# Patient Record
Sex: MALE | Race: WHITE | NOT HISPANIC OR LATINO | Employment: FULL TIME | URBAN - METROPOLITAN AREA
[De-identification: names, ages, dates, MRNs, and addresses within clinical notes are randomized per-mention and may not be internally consistent; named-entity substitution may affect disease eponyms.]

---

## 2023-04-22 DIAGNOSIS — I28.8 DILATION OF PULMONARY ARTERY (MULTI): ICD-10-CM

## 2023-04-22 DIAGNOSIS — R93.1 AGATSTON CAC SCORE, >400: Primary | ICD-10-CM

## 2023-04-22 DIAGNOSIS — I77.810 ASCENDING AORTA DILATATION (CMS-HCC): ICD-10-CM

## 2023-05-04 DIAGNOSIS — E03.9 HYPOTHYROIDISM, UNSPECIFIED: ICD-10-CM

## 2023-05-04 RX ORDER — LEVOTHYROXINE SODIUM 175 UG/1
TABLET ORAL
Qty: 90 TABLET | Refills: 1 | Status: SHIPPED | OUTPATIENT
Start: 2023-05-04 | End: 2023-12-04

## 2023-06-06 DIAGNOSIS — I10 ESSENTIAL (PRIMARY) HYPERTENSION: ICD-10-CM

## 2023-06-06 RX ORDER — LISINOPRIL 10 MG/1
TABLET ORAL
Qty: 90 TABLET | Refills: 1 | Status: SHIPPED | OUTPATIENT
Start: 2023-06-06 | End: 2023-12-04

## 2023-06-14 LAB
ALANINE AMINOTRANSFERASE (SGPT) (U/L) IN SER/PLAS: 33 U/L (ref 10–52)
ALBUMIN (G/DL) IN SER/PLAS: 4.4 G/DL (ref 3.4–5)
ALKALINE PHOSPHATASE (U/L) IN SER/PLAS: 51 U/L (ref 33–136)
ANION GAP IN SER/PLAS: 12 MMOL/L (ref 10–20)
APPEARANCE, URINE: CLEAR
ASPARTATE AMINOTRANSFERASE (SGOT) (U/L) IN SER/PLAS: 22 U/L (ref 9–39)
BASOPHILS (10*3/UL) IN BLOOD BY AUTOMATED COUNT: 0.07 X10E9/L (ref 0–0.1)
BASOPHILS/100 LEUKOCYTES IN BLOOD BY AUTOMATED COUNT: 1.1 % (ref 0–2)
BILIRUBIN TOTAL (MG/DL) IN SER/PLAS: 0.8 MG/DL (ref 0–1.2)
BILIRUBIN, URINE: NEGATIVE
BLOOD, URINE: NEGATIVE
CALCIUM (MG/DL) IN SER/PLAS: 9.2 MG/DL (ref 8.6–10.6)
CARBON DIOXIDE, TOTAL (MMOL/L) IN SER/PLAS: 27 MMOL/L (ref 21–32)
CHLORIDE (MMOL/L) IN SER/PLAS: 104 MMOL/L (ref 98–107)
CHOLESTEROL (MG/DL) IN SER/PLAS: 191 MG/DL (ref 0–199)
CHOLESTEROL IN HDL (MG/DL) IN SER/PLAS: 46.6 MG/DL
CHOLESTEROL/HDL RATIO: 4.1
COLOR, URINE: YELLOW
CREATININE (MG/DL) IN SER/PLAS: 0.64 MG/DL (ref 0.5–1.3)
EOSINOPHILS (10*3/UL) IN BLOOD BY AUTOMATED COUNT: 0.47 X10E9/L (ref 0–0.7)
EOSINOPHILS/100 LEUKOCYTES IN BLOOD BY AUTOMATED COUNT: 7.4 % (ref 0–6)
ERYTHROCYTE DISTRIBUTION WIDTH (RATIO) BY AUTOMATED COUNT: 12.4 % (ref 11.5–14.5)
ERYTHROCYTE MEAN CORPUSCULAR HEMOGLOBIN CONCENTRATION (G/DL) BY AUTOMATED: 32.6 G/DL (ref 32–36)
ERYTHROCYTE MEAN CORPUSCULAR VOLUME (FL) BY AUTOMATED COUNT: 95 FL (ref 80–100)
ERYTHROCYTES (10*6/UL) IN BLOOD BY AUTOMATED COUNT: 5.18 X10E12/L (ref 4.5–5.9)
GFR MALE: >90 ML/MIN/1.73M2
GLUCOSE (MG/DL) IN SER/PLAS: 99 MG/DL (ref 74–99)
GLUCOSE, URINE: NEGATIVE MG/DL
HEMATOCRIT (%) IN BLOOD BY AUTOMATED COUNT: 49.1 % (ref 41–52)
HEMOGLOBIN (G/DL) IN BLOOD: 16 G/DL (ref 13.5–17.5)
IMMATURE GRANULOCYTES/100 LEUKOCYTES IN BLOOD BY AUTOMATED COUNT: 0.8 % (ref 0–0.9)
KETONES, URINE: NEGATIVE MG/DL
LDL: 112 MG/DL (ref 0–99)
LEUKOCYTE ESTERASE, URINE: NEGATIVE
LEUKOCYTES (10*3/UL) IN BLOOD BY AUTOMATED COUNT: 6.4 X10E9/L (ref 4.4–11.3)
LYMPHOCYTES (10*3/UL) IN BLOOD BY AUTOMATED COUNT: 2.23 X10E9/L (ref 1.2–4.8)
LYMPHOCYTES/100 LEUKOCYTES IN BLOOD BY AUTOMATED COUNT: 35 % (ref 13–44)
MONOCYTES (10*3/UL) IN BLOOD BY AUTOMATED COUNT: 0.74 X10E9/L (ref 0.1–1)
MONOCYTES/100 LEUKOCYTES IN BLOOD BY AUTOMATED COUNT: 11.6 % (ref 2–10)
NEUTROPHILS (10*3/UL) IN BLOOD BY AUTOMATED COUNT: 2.82 X10E9/L (ref 1.2–7.7)
NEUTROPHILS/100 LEUKOCYTES IN BLOOD BY AUTOMATED COUNT: 44.1 % (ref 40–80)
NITRITE, URINE: NEGATIVE
NRBC (PER 100 WBCS) BY AUTOMATED COUNT: 0 /100 WBC (ref 0–0)
PH, URINE: 5 (ref 5–8)
PLATELETS (10*3/UL) IN BLOOD AUTOMATED COUNT: 230 X10E9/L (ref 150–450)
POTASSIUM (MMOL/L) IN SER/PLAS: 4.5 MMOL/L (ref 3.5–5.3)
PROSTATE SPECIFIC AG (NG/ML) IN SER/PLAS: 1.24 NG/ML (ref 0–4)
PROTEIN TOTAL: 6.4 G/DL (ref 6.4–8.2)
PROTEIN, URINE: NEGATIVE MG/DL
SODIUM (MMOL/L) IN SER/PLAS: 138 MMOL/L (ref 136–145)
SPECIFIC GRAVITY, URINE: 1.01 (ref 1–1.03)
THYROTROPIN (MIU/L) IN SER/PLAS BY DETECTION LIMIT <= 0.05 MIU/L: 1.23 MIU/L (ref 0.44–3.98)
THYROXINE (T4) FREE (NG/DL) IN SER/PLAS: 1.31 NG/DL (ref 0.78–1.48)
TRIGLYCERIDE (MG/DL) IN SER/PLAS: 162 MG/DL (ref 0–149)
URATE (MG/DL) IN SER/PLAS: 5.5 MG/DL (ref 4–7.5)
UREA NITROGEN (MG/DL) IN SER/PLAS: 11 MG/DL (ref 6–23)
UROBILINOGEN, URINE: <2 MG/DL (ref 0–1.9)
VLDL: 32 MG/DL (ref 0–40)

## 2023-06-16 PROBLEM — R79.89 LOW SERUM TESTOSTERONE LEVEL: Status: ACTIVE | Noted: 2023-06-16

## 2023-06-16 PROBLEM — M1A.9XX0 CHRONIC GOUT INVOLVING TOE OF RIGHT FOOT WITHOUT TOPHUS: Status: ACTIVE | Noted: 2023-06-16

## 2023-06-16 PROBLEM — R42 LIGHTHEADEDNESS: Status: ACTIVE | Noted: 2023-06-16

## 2023-06-16 PROBLEM — M10.9 GOUT: Status: ACTIVE | Noted: 2023-06-16

## 2023-06-16 PROBLEM — R12 HEARTBURN: Status: ACTIVE | Noted: 2023-06-16

## 2023-06-16 PROBLEM — R07.89 ATYPICAL CHEST PAIN: Status: ACTIVE | Noted: 2023-06-16

## 2023-06-16 PROBLEM — E03.9 ACQUIRED HYPOTHYROIDISM: Status: ACTIVE | Noted: 2023-06-16

## 2023-06-16 PROBLEM — E78.2 MIXED HYPERLIPIDEMIA: Status: ACTIVE | Noted: 2023-06-16

## 2023-06-16 PROBLEM — M10.022: Status: ACTIVE | Noted: 2023-06-16

## 2023-06-16 PROBLEM — N40.0 BENIGN PROSTATIC HYPERPLASIA: Status: ACTIVE | Noted: 2023-06-16

## 2023-06-16 PROBLEM — I10 ESSENTIAL HYPERTENSION: Status: ACTIVE | Noted: 2023-06-16

## 2023-06-16 RX ORDER — ROSUVASTATIN CALCIUM 5 MG/1
5 TABLET, COATED ORAL DAILY
COMMUNITY
End: 2023-12-19 | Stop reason: SDUPTHER

## 2023-06-16 RX ORDER — ALLOPURINOL 300 MG/1
300 TABLET ORAL DAILY
COMMUNITY
End: 2023-06-19 | Stop reason: SDUPTHER

## 2023-06-16 RX ORDER — COLCHICINE 0.6 MG/1
TABLET ORAL 2 TIMES DAILY
COMMUNITY
Start: 2016-08-22

## 2023-06-16 RX ORDER — LEVOTHYROXINE SODIUM 175 UG/1
1 TABLET ORAL DAILY
COMMUNITY
Start: 2017-12-26 | End: 2023-06-19 | Stop reason: SDUPTHER

## 2023-06-19 ENCOUNTER — OFFICE VISIT (OUTPATIENT)
Dept: PRIMARY CARE | Facility: CLINIC | Age: 68
End: 2023-06-19
Payer: COMMERCIAL

## 2023-06-19 VITALS
BODY MASS INDEX: 32.9 KG/M2 | HEART RATE: 56 BPM | WEIGHT: 235 LBS | OXYGEN SATURATION: 97 % | HEIGHT: 71 IN | DIASTOLIC BLOOD PRESSURE: 80 MMHG | SYSTOLIC BLOOD PRESSURE: 126 MMHG

## 2023-06-19 DIAGNOSIS — R93.1 AGATSTON CAC SCORE, >400: ICD-10-CM

## 2023-06-19 DIAGNOSIS — E03.9 ACQUIRED HYPOTHYROIDISM: ICD-10-CM

## 2023-06-19 DIAGNOSIS — E78.2 MIXED HYPERLIPIDEMIA: ICD-10-CM

## 2023-06-19 DIAGNOSIS — I71.21 ANEURYSM OF ASCENDING AORTA WITHOUT RUPTURE (CMS-HCC): ICD-10-CM

## 2023-06-19 DIAGNOSIS — I10 ESSENTIAL HYPERTENSION: ICD-10-CM

## 2023-06-19 DIAGNOSIS — I28.8 DILATION OF PULMONARY ARTERY (MULTI): ICD-10-CM

## 2023-06-19 DIAGNOSIS — M1A.0710 IDIOPATHIC CHRONIC GOUT OF RIGHT FOOT WITHOUT TOPHUS: Primary | ICD-10-CM

## 2023-06-19 PROCEDURE — 1159F MED LIST DOCD IN RCRD: CPT | Performed by: INTERNAL MEDICINE

## 2023-06-19 PROCEDURE — 3074F SYST BP LT 130 MM HG: CPT | Performed by: INTERNAL MEDICINE

## 2023-06-19 PROCEDURE — 3079F DIAST BP 80-89 MM HG: CPT | Performed by: INTERNAL MEDICINE

## 2023-06-19 PROCEDURE — 1160F RVW MEDS BY RX/DR IN RCRD: CPT | Performed by: INTERNAL MEDICINE

## 2023-06-19 PROCEDURE — 99214 OFFICE O/P EST MOD 30 MIN: CPT | Performed by: INTERNAL MEDICINE

## 2023-06-19 PROCEDURE — 1036F TOBACCO NON-USER: CPT | Performed by: INTERNAL MEDICINE

## 2023-06-19 RX ORDER — ACETAMINOPHEN 160 MG/5ML
200 SUSPENSION, ORAL (FINAL DOSE FORM) ORAL DAILY
COMMUNITY

## 2023-06-19 RX ORDER — ALLOPURINOL 300 MG/1
300 TABLET ORAL DAILY
Qty: 90 TABLET | Refills: 1 | Status: SHIPPED | OUTPATIENT
Start: 2023-06-19 | End: 2023-12-19 | Stop reason: SDUPTHER

## 2023-06-19 ASSESSMENT — ENCOUNTER SYMPTOMS
ABDOMINAL PAIN: 0
MYALGIAS: 0
NERVOUS/ANXIOUS: 0
PALPITATIONS: 0
DIZZINESS: 0
FATIGUE: 0
HYPERTENSION: 1
DEPRESSED MOOD: 0
SHORTNESS OF BREATH: 0
TREMORS: 0

## 2023-06-19 NOTE — PROGRESS NOTES
Subjective   Patient ID: Vitaly Puri is a 67 y.o. male who presents for Follow-up chronic medical problems.    Doing well.  Back to work FT.  Looking at consulting.  Diet, 2 meals daily.  HEP, not as much cardio.  Joining a club, wants to swim.  Weight the same.  Reviewed cardiac score, cardiology consult.  Meds and labs reviewed.      Hypertension  This is a chronic problem. The current episode started more than 1 year ago. The problem has been resolved since onset. The problem is controlled. Pertinent negatives include no chest pain, palpitations, peripheral edema or shortness of breath. There are no associated agents to hypertension. The current treatment provides significant improvement. There are no compliance problems.  Identifiable causes of hypertension include a thyroid problem.   Hyperlipidemia  This is a chronic problem. The current episode started more than 1 year ago. The problem is controlled. Recent lipid tests were reviewed and are normal. There are no known factors aggravating his hyperlipidemia. Pertinent negatives include no chest pain, myalgias or shortness of breath. Current antihyperlipidemic treatment includes statins. The current treatment provides moderate improvement of lipids. There are no compliance problems.    Thyroid Problem  Presents for follow-up visit. Patient reports no anxiety, depressed mood, fatigue, palpitations or tremors. The symptoms have been stable. His past medical history is significant for hyperlipidemia.        Review of Systems   Constitutional:  Negative for fatigue.   Respiratory:  Negative for shortness of breath.    Cardiovascular:  Negative for chest pain and palpitations.   Gastrointestinal:  Negative for abdominal pain.   Musculoskeletal:  Negative for myalgias.   Neurological:  Negative for dizziness and tremors.   Psychiatric/Behavioral:  The patient is not nervous/anxious.        Objective   /80 (BP Location: Right arm, Patient Position: Sitting)    "Pulse 56   Ht 1.791 m (5' 10.5\")   Wt 107 kg (235 lb)   SpO2 97%   BMI 33.24 kg/m²     Physical Exam  Constitutional:       Appearance: Normal appearance.   Cardiovascular:      Rate and Rhythm: Normal rate and regular rhythm.      Pulses: Normal pulses.      Heart sounds: Normal heart sounds.   Pulmonary:      Effort: Pulmonary effort is normal.      Breath sounds: Normal breath sounds.   Neurological:      General: No focal deficit present.      Mental Status: He is alert.   Psychiatric:         Mood and Affect: Mood normal.         Behavior: Behavior normal.         Thought Content: Thought content normal.         Judgment: Judgment normal.         Assessment/Plan   Problem List Items Addressed This Visit          Circulatory    Agatston CAC score, >400     Rec cardiology consult.         Relevant Orders    Referral to Cardiology    Essential hypertension     BP at goal with current medications.  Rec low salt diet.  Check BP at home, goal < 140/90.   Recommend diet, exercise and weight management.          Relevant Orders    Basic metabolic panel    Aneurysm of ascending aorta without rupture (CMS/HCC)     Rec cardiology consult.         Relevant Orders    Referral to Cardiology    Dilation of pulmonary artery (CMS/HCC)     Rec cardiology consult.         Relevant Orders    Referral to Cardiology       Endocrine/Metabolic    Acquired hypothyroidism     Thyroid, free T4 and TSH deonte with levothyroxine or synthroid.  Take medication 30 to 60 minutes before breakfast.          Relevant Orders    TSH    T4, free       Other    Gout - Primary     Uric acid normal with allopurinol.         Relevant Medications    allopurinol (Zyloprim) 300 mg tablet    Mixed hyperlipidemia     Lipids, LDL not at goal with statin.  Recommend low fat/cholesterol diet.   Add Coq10 200 mg daily.  Then increase rosuvastatin 5 mg to 5 days a week as tolerated.  Check labs in 6 months.         Relevant Orders    Lipid panel    ALT " 0

## 2023-06-19 NOTE — ASSESSMENT & PLAN NOTE
Thyroid, free T4 and TSH deonte with levothyroxine or synthroid.  Take medication 30 to 60 minutes before breakfast.

## 2023-06-19 NOTE — ASSESSMENT & PLAN NOTE
BP at goal with current medications.  Rec low salt diet.  Check BP at home, goal < 140/90.   Recommend diet, exercise and weight management.

## 2023-06-19 NOTE — ASSESSMENT & PLAN NOTE
Lipids, LDL not at goal with statin.  Recommend low fat/cholesterol diet.   Add Coq10 200 mg daily.  Then increase rosuvastatin 5 mg to 5 days a week as tolerated.  Check labs in 6 months.

## 2023-12-11 ENCOUNTER — LAB (OUTPATIENT)
Dept: LAB | Facility: LAB | Age: 68
End: 2023-12-11
Payer: COMMERCIAL

## 2023-12-11 DIAGNOSIS — E03.9 ACQUIRED HYPOTHYROIDISM: ICD-10-CM

## 2023-12-11 DIAGNOSIS — E78.2 MIXED HYPERLIPIDEMIA: ICD-10-CM

## 2023-12-11 DIAGNOSIS — I10 ESSENTIAL HYPERTENSION: ICD-10-CM

## 2023-12-11 PROCEDURE — 84443 ASSAY THYROID STIM HORMONE: CPT

## 2023-12-11 PROCEDURE — 80061 LIPID PANEL: CPT

## 2023-12-11 PROCEDURE — 80048 BASIC METABOLIC PNL TOTAL CA: CPT

## 2023-12-11 PROCEDURE — 84439 ASSAY OF FREE THYROXINE: CPT

## 2023-12-11 PROCEDURE — 36415 COLL VENOUS BLD VENIPUNCTURE: CPT

## 2023-12-11 PROCEDURE — 84460 ALANINE AMINO (ALT) (SGPT): CPT

## 2023-12-12 LAB
ALT SERPL W P-5'-P-CCNC: 35 U/L (ref 10–52)
ANION GAP SERPL CALC-SCNC: 14 MMOL/L (ref 10–20)
BUN SERPL-MCNC: 10 MG/DL (ref 6–23)
CALCIUM SERPL-MCNC: 9.3 MG/DL (ref 8.6–10.6)
CHLORIDE SERPL-SCNC: 102 MMOL/L (ref 98–107)
CHOLEST SERPL-MCNC: 175 MG/DL (ref 0–199)
CHOLESTEROL/HDL RATIO: 4
CO2 SERPL-SCNC: 28 MMOL/L (ref 21–32)
CREAT SERPL-MCNC: 0.69 MG/DL (ref 0.5–1.3)
GFR SERPL CREATININE-BSD FRML MDRD: >90 ML/MIN/1.73M*2
GLUCOSE SERPL-MCNC: 90 MG/DL (ref 74–99)
HDLC SERPL-MCNC: 44 MG/DL
LDLC SERPL CALC-MCNC: 94 MG/DL
NON HDL CHOLESTEROL: 131 MG/DL (ref 0–149)
POTASSIUM SERPL-SCNC: 4.5 MMOL/L (ref 3.5–5.3)
SODIUM SERPL-SCNC: 139 MMOL/L (ref 136–145)
T4 FREE SERPL-MCNC: 1.5 NG/DL (ref 0.78–1.48)
TRIGL SERPL-MCNC: 184 MG/DL (ref 0–149)
TSH SERPL-ACNC: 0.77 MIU/L (ref 0.44–3.98)
VLDL: 37 MG/DL (ref 0–40)

## 2023-12-19 ENCOUNTER — OFFICE VISIT (OUTPATIENT)
Dept: PRIMARY CARE | Facility: CLINIC | Age: 68
End: 2023-12-19
Payer: COMMERCIAL

## 2023-12-19 VITALS
HEIGHT: 71 IN | BODY MASS INDEX: 33.46 KG/M2 | SYSTOLIC BLOOD PRESSURE: 132 MMHG | HEART RATE: 58 BPM | OXYGEN SATURATION: 96 % | WEIGHT: 239 LBS | DIASTOLIC BLOOD PRESSURE: 84 MMHG

## 2023-12-19 DIAGNOSIS — M1A.0710 IDIOPATHIC CHRONIC GOUT OF RIGHT FOOT WITHOUT TOPHUS: ICD-10-CM

## 2023-12-19 DIAGNOSIS — Z12.11 COLON CANCER SCREENING: ICD-10-CM

## 2023-12-19 DIAGNOSIS — E78.2 MIXED HYPERLIPIDEMIA: ICD-10-CM

## 2023-12-19 DIAGNOSIS — Z12.5 PROSTATE CANCER SCREENING: ICD-10-CM

## 2023-12-19 DIAGNOSIS — E03.9 ACQUIRED HYPOTHYROIDISM: ICD-10-CM

## 2023-12-19 DIAGNOSIS — Z00.00 ENCOUNTER FOR PREVENTATIVE ADULT HEALTH CARE EXAMINATION: Primary | ICD-10-CM

## 2023-12-19 PROCEDURE — 1160F RVW MEDS BY RX/DR IN RCRD: CPT | Performed by: INTERNAL MEDICINE

## 2023-12-19 PROCEDURE — 1159F MED LIST DOCD IN RCRD: CPT | Performed by: INTERNAL MEDICINE

## 2023-12-19 PROCEDURE — 99397 PER PM REEVAL EST PAT 65+ YR: CPT | Performed by: INTERNAL MEDICINE

## 2023-12-19 PROCEDURE — 3079F DIAST BP 80-89 MM HG: CPT | Performed by: INTERNAL MEDICINE

## 2023-12-19 PROCEDURE — 3075F SYST BP GE 130 - 139MM HG: CPT | Performed by: INTERNAL MEDICINE

## 2023-12-19 PROCEDURE — 1036F TOBACCO NON-USER: CPT | Performed by: INTERNAL MEDICINE

## 2023-12-19 RX ORDER — ALLOPURINOL 300 MG/1
300 TABLET ORAL DAILY
Qty: 90 TABLET | Refills: 3 | Status: SHIPPED | OUTPATIENT
Start: 2023-12-19

## 2023-12-19 RX ORDER — ROSUVASTATIN CALCIUM 5 MG/1
5 TABLET, COATED ORAL DAILY
Qty: 90 TABLET | Refills: 3 | Status: SHIPPED | OUTPATIENT
Start: 2023-12-19

## 2023-12-19 ASSESSMENT — ENCOUNTER SYMPTOMS
PSYCHIATRIC NEGATIVE: 1
GASTROINTESTINAL NEGATIVE: 1
NEUROLOGICAL NEGATIVE: 1
EYES NEGATIVE: 1
CARDIOVASCULAR NEGATIVE: 1
RESPIRATORY NEGATIVE: 1
MUSCULOSKELETAL NEGATIVE: 1
CONSTITUTIONAL NEGATIVE: 1
ALLERGIC/IMMUNOLOGIC NEGATIVE: 1
HEMATOLOGIC/LYMPHATIC NEGATIVE: 1
ENDOCRINE NEGATIVE: 1

## 2023-12-19 NOTE — PROGRESS NOTES
"Subjective   Patient ID: Vitaly Puri is a 68 y.o. male who presents for Annual Exam.    Well Adult Physical   Patient here for a comprehensive physical exam.The patient reports problems - recent cold. Medical conditions deonte. No change in meds. Labs reviewed.    Do you take any herbs or supplements that were not prescribed by a doctor? no   Are you taking calcium supplements? no   Are you taking aspirin daily? no     History:  Date last PSA: 6-2023    Cologuard: 2-2020, ordered today     Review of Systems   Constitutional: Negative.    HENT: Negative.     Eyes: Negative.    Respiratory: Negative.     Cardiovascular: Negative.    Gastrointestinal: Negative.    Endocrine: Negative.    Genitourinary: Negative.         Occasional nocturia.   Musculoskeletal: Negative.    Skin: Negative.    Allergic/Immunologic: Negative.    Neurological: Negative.    Hematological: Negative.    Psychiatric/Behavioral: Negative.         Objective   /84 (BP Location: Right arm, Patient Position: Sitting)   Pulse 58   Ht 1.791 m (5' 10.5\")   Wt 108 kg (239 lb)   SpO2 96%   BMI 33.81 kg/m²     Physical Exam  Vitals and nursing note reviewed.   Constitutional:       Appearance: Normal appearance.   HENT:      Head: Normocephalic and atraumatic.      Right Ear: Tympanic membrane normal.      Left Ear: Tympanic membrane normal.      Nose: Nose normal.      Mouth/Throat:      Pharynx: Oropharynx is clear.   Eyes:      Extraocular Movements: Extraocular movements intact.      Conjunctiva/sclera: Conjunctivae normal.      Pupils: Pupils are equal, round, and reactive to light.   Neck:      Vascular: No carotid bruit.   Cardiovascular:      Rate and Rhythm: Normal rate and regular rhythm.      Pulses: Normal pulses.      Heart sounds: Normal heart sounds.   Pulmonary:      Effort: Pulmonary effort is normal.      Breath sounds: Normal breath sounds.   Abdominal:      General: Bowel sounds are normal.      Palpations: Abdomen is " soft.   Musculoskeletal:         General: Normal range of motion.      Cervical back: Normal range of motion and neck supple.   Skin:     General: Skin is warm and dry.   Neurological:      General: No focal deficit present.      Mental Status: He is alert and oriented to person, place, and time. Mental status is at baseline.   Psychiatric:         Mood and Affect: Mood normal.         Behavior: Behavior normal.         Thought Content: Thought content normal.         Judgment: Judgment normal.       Assessment/Plan     Adult Health Maintenance  BP at goal with current medications.  Rec low salt diet.  Check BP at home, goal < 140/90.  Lipids, LDL at goal with statin.  Recommend low fat/cholesterol diet.  Thyroid, free T4 and TSH deonte with levothyroxine or synthroid.  Take medication 30 to 60 minutes before breakfast.  Pt prefers cologuard for screening.  Recommend diet, exercise and weight management.  Fu 6 months.    Problem List Items Addressed This Visit             ICD-10-CM    Acquired hypothyroidism E03.9    Relevant Orders    TSH    T4, free    Colon cancer screening - Primary Z12.11    Relevant Orders    Cologuard® colon cancer screening

## 2024-01-10 LAB — NONINV COLON CA DNA+OCC BLD SCRN STL QL: NEGATIVE

## 2024-03-08 DIAGNOSIS — I10 ESSENTIAL (PRIMARY) HYPERTENSION: ICD-10-CM

## 2024-03-08 RX ORDER — LISINOPRIL 10 MG/1
TABLET ORAL
Qty: 90 TABLET | Refills: 3 | Status: SHIPPED | OUTPATIENT
Start: 2024-03-08

## 2024-04-09 DIAGNOSIS — E03.9 HYPOTHYROIDISM, UNSPECIFIED: ICD-10-CM

## 2024-04-10 RX ORDER — LEVOTHYROXINE SODIUM 175 UG/1
TABLET ORAL
Qty: 90 TABLET | Refills: 3 | Status: SHIPPED | OUTPATIENT
Start: 2024-04-10

## 2024-06-25 ENCOUNTER — APPOINTMENT (OUTPATIENT)
Dept: PRIMARY CARE | Facility: CLINIC | Age: 69
End: 2024-06-25
Payer: COMMERCIAL

## 2024-07-02 ENCOUNTER — OFFICE VISIT (OUTPATIENT)
Dept: CARDIOLOGY | Facility: CLINIC | Age: 69
End: 2024-07-02
Payer: COMMERCIAL

## 2024-07-02 VITALS
HEART RATE: 63 BPM | OXYGEN SATURATION: 96 % | WEIGHT: 232 LBS | BODY MASS INDEX: 32.82 KG/M2 | SYSTOLIC BLOOD PRESSURE: 145 MMHG | DIASTOLIC BLOOD PRESSURE: 84 MMHG

## 2024-07-02 DIAGNOSIS — R93.1 AGATSTON CAC SCORE, >400: Primary | ICD-10-CM

## 2024-07-02 DIAGNOSIS — E78.2 MIXED HYPERLIPIDEMIA: ICD-10-CM

## 2024-07-02 DIAGNOSIS — I10 ESSENTIAL HYPERTENSION: ICD-10-CM

## 2024-07-02 DIAGNOSIS — I71.21 ANEURYSM OF ASCENDING AORTA WITHOUT RUPTURE (CMS-HCC): ICD-10-CM

## 2024-07-02 PROCEDURE — 99213 OFFICE O/P EST LOW 20 MIN: CPT | Performed by: STUDENT IN AN ORGANIZED HEALTH CARE EDUCATION/TRAINING PROGRAM

## 2024-07-02 PROCEDURE — 1036F TOBACCO NON-USER: CPT | Performed by: STUDENT IN AN ORGANIZED HEALTH CARE EDUCATION/TRAINING PROGRAM

## 2024-07-02 PROCEDURE — 3079F DIAST BP 80-89 MM HG: CPT | Performed by: STUDENT IN AN ORGANIZED HEALTH CARE EDUCATION/TRAINING PROGRAM

## 2024-07-02 PROCEDURE — 3077F SYST BP >= 140 MM HG: CPT | Performed by: STUDENT IN AN ORGANIZED HEALTH CARE EDUCATION/TRAINING PROGRAM

## 2024-07-02 PROCEDURE — 1159F MED LIST DOCD IN RCRD: CPT | Performed by: STUDENT IN AN ORGANIZED HEALTH CARE EDUCATION/TRAINING PROGRAM

## 2024-08-06 ENCOUNTER — LAB (OUTPATIENT)
Dept: LAB | Facility: LAB | Age: 69
End: 2024-08-06
Payer: COMMERCIAL

## 2024-08-06 DIAGNOSIS — Z12.5 PROSTATE CANCER SCREENING: ICD-10-CM

## 2024-08-06 DIAGNOSIS — Z00.00 ENCOUNTER FOR PREVENTATIVE ADULT HEALTH CARE EXAMINATION: ICD-10-CM

## 2024-08-06 DIAGNOSIS — E03.9 ACQUIRED HYPOTHYROIDISM: ICD-10-CM

## 2024-08-06 LAB
ALBUMIN SERPL BCP-MCNC: 4.1 G/DL (ref 3.4–5)
ALP SERPL-CCNC: 48 U/L (ref 33–136)
ALT SERPL W P-5'-P-CCNC: 32 U/L (ref 10–52)
ANION GAP SERPL CALC-SCNC: 11 MMOL/L (ref 10–20)
APPEARANCE UR: CLEAR
AST SERPL W P-5'-P-CCNC: 24 U/L (ref 9–39)
BASOPHILS # BLD AUTO: 0.1 X10*3/UL (ref 0–0.1)
BASOPHILS NFR BLD AUTO: 1.3 %
BILIRUB SERPL-MCNC: 0.8 MG/DL (ref 0–1.2)
BILIRUB UR STRIP.AUTO-MCNC: NEGATIVE MG/DL
BUN SERPL-MCNC: 13 MG/DL (ref 6–23)
CALCIUM SERPL-MCNC: 9.2 MG/DL (ref 8.6–10.6)
CHLORIDE SERPL-SCNC: 104 MMOL/L (ref 98–107)
CHOLEST SERPL-MCNC: 166 MG/DL (ref 0–199)
CHOLESTEROL/HDL RATIO: 3.6
CO2 SERPL-SCNC: 29 MMOL/L (ref 21–32)
COLOR UR: NORMAL
CREAT SERPL-MCNC: 0.78 MG/DL (ref 0.5–1.3)
EGFRCR SERPLBLD CKD-EPI 2021: >90 ML/MIN/1.73M*2
EOSINOPHIL # BLD AUTO: 0.55 X10*3/UL (ref 0–0.7)
EOSINOPHIL NFR BLD AUTO: 6.9 %
ERYTHROCYTE [DISTWIDTH] IN BLOOD BY AUTOMATED COUNT: 12.2 % (ref 11.5–14.5)
GLUCOSE SERPL-MCNC: 94 MG/DL (ref 74–99)
GLUCOSE UR STRIP.AUTO-MCNC: NORMAL MG/DL
HCT VFR BLD AUTO: 47.7 % (ref 41–52)
HDLC SERPL-MCNC: 45.6 MG/DL
HGB BLD-MCNC: 16.1 G/DL (ref 13.5–17.5)
IMM GRANULOCYTES # BLD AUTO: 0.07 X10*3/UL (ref 0–0.7)
IMM GRANULOCYTES NFR BLD AUTO: 0.9 % (ref 0–0.9)
KETONES UR STRIP.AUTO-MCNC: NEGATIVE MG/DL
LDLC SERPL CALC-MCNC: 88 MG/DL
LEUKOCYTE ESTERASE UR QL STRIP.AUTO: NEGATIVE
LYMPHOCYTES # BLD AUTO: 2.61 X10*3/UL (ref 1.2–4.8)
LYMPHOCYTES NFR BLD AUTO: 32.7 %
MCH RBC QN AUTO: 31.5 PG (ref 26–34)
MCHC RBC AUTO-ENTMCNC: 33.8 G/DL (ref 32–36)
MCV RBC AUTO: 93 FL (ref 80–100)
MONOCYTES # BLD AUTO: 0.93 X10*3/UL (ref 0.1–1)
MONOCYTES NFR BLD AUTO: 11.7 %
NEUTROPHILS # BLD AUTO: 3.71 X10*3/UL (ref 1.2–7.7)
NEUTROPHILS NFR BLD AUTO: 46.5 %
NITRITE UR QL STRIP.AUTO: NEGATIVE
NON HDL CHOLESTEROL: 120 MG/DL (ref 0–149)
NRBC BLD-RTO: 0.3 /100 WBCS (ref 0–0)
PH UR STRIP.AUTO: 5.5 [PH]
PLATELET # BLD AUTO: 231 X10*3/UL (ref 150–450)
POTASSIUM SERPL-SCNC: 4.4 MMOL/L (ref 3.5–5.3)
PROT SERPL-MCNC: 6.3 G/DL (ref 6.4–8.2)
PROT UR STRIP.AUTO-MCNC: NEGATIVE MG/DL
PSA SERPL-MCNC: 0.76 NG/ML
RBC # BLD AUTO: 5.11 X10*6/UL (ref 4.5–5.9)
RBC # UR STRIP.AUTO: NEGATIVE /UL
SODIUM SERPL-SCNC: 140 MMOL/L (ref 136–145)
SP GR UR STRIP.AUTO: 1.01
T4 FREE SERPL-MCNC: 1.33 NG/DL (ref 0.78–1.48)
TRIGL SERPL-MCNC: 161 MG/DL (ref 0–149)
TSH SERPL-ACNC: 1.87 MIU/L (ref 0.44–3.98)
UROBILINOGEN UR STRIP.AUTO-MCNC: NORMAL MG/DL
VLDL: 32 MG/DL (ref 0–40)
WBC # BLD AUTO: 8 X10*3/UL (ref 4.4–11.3)

## 2024-08-06 PROCEDURE — 85025 COMPLETE CBC W/AUTO DIFF WBC: CPT

## 2024-08-06 PROCEDURE — 84439 ASSAY OF FREE THYROXINE: CPT

## 2024-08-06 PROCEDURE — 84443 ASSAY THYROID STIM HORMONE: CPT

## 2024-08-06 PROCEDURE — 80061 LIPID PANEL: CPT

## 2024-08-06 PROCEDURE — 84153 ASSAY OF PSA TOTAL: CPT

## 2024-08-06 PROCEDURE — 80053 COMPREHEN METABOLIC PANEL: CPT

## 2024-08-06 PROCEDURE — 81003 URINALYSIS AUTO W/O SCOPE: CPT

## 2024-08-06 PROCEDURE — 36415 COLL VENOUS BLD VENIPUNCTURE: CPT

## 2024-08-12 PROBLEM — R07.89 ATYPICAL CHEST PAIN: Status: RESOLVED | Noted: 2023-06-16 | Resolved: 2024-08-12

## 2024-08-12 ASSESSMENT — ENCOUNTER SYMPTOMS
GASTROINTESTINAL NEGATIVE: 1
HEMATOLOGIC/LYMPHATIC NEGATIVE: 1
SYNCOPE: 0
PSYCHIATRIC NEGATIVE: 1
DYSPNEA ON EXERTION: 0
CONSTITUTIONAL NEGATIVE: 1
NEUROLOGICAL NEGATIVE: 1
EYES NEGATIVE: 1
ENDOCRINE NEGATIVE: 1
PALPITATIONS: 0
RESPIRATORY NEGATIVE: 1
ORTHOPNEA: 0
MUSCULOSKELETAL NEGATIVE: 1
PND: 0
NEAR-SYNCOPE: 0
ALLERGIC/IMMUNOLOGIC NEGATIVE: 1

## 2024-08-12 NOTE — PROGRESS NOTES
[facility] Cardiology Outpatient Follow-up Visit     Reason for Visit: [facility]    HPI: Vitaly Puri is a 68 y.o.  male who presents today for a follow-up visit.Past medical history of atherosclerotic heart disease. Elevated coronary calcium score, dyslipidemia, history of gout, ventral hernia, remote history of pleural effusion/empyema (2000).     Patient recently underwent coronary calcium scoring which revealed an elevated coronary calcium score with total score of 448. Patient was referred to cardiology to establish care.     Patient presented cardiology clinic on 6/29/2023. Patient is currently asymptomatic from a cardiovascular standpoint. Denies any active chest pain, orthopnea, PND, syncope, presyncope, palpitations, fever/chills, nausea/vomiting, or pedal edema. Patient has chronic intermittent chest wall pain status post his surgery for empyema. He is currently tolerating activity without significant cardiovascular complaints.     Vitaly presented to cardiology clinic on 7/2/2024.  Patient is currently asymptomatic from a cardiac standpoint.  Continue cardiac management as below.  Patient has a history of mildly ectatic ascending aorta her CT imaging in April 2023; recommend repeat CT imaging of the aorta in 1 year or earlier if symptomatic.  Continue rosuvastatin repeat fasting lipid panel to assess LDL control.  Follow-up in 1 year or earlier if needed in cardiology clinic.    Past Medical History:   - As above    Surgical History:   He has a past surgical history that includes Carpal tunnel release (05/07/2014); Umbilical hernia repair (05/07/2014); and Other surgical history (05/07/2014).    Family History:   Family History   Problem Relation Name Age of Onset    Coronary artery disease Father      Atrial fibrillation Brother       Allergies:  Penicillins and Simvastatin     Social History:   - former tobacco user (quit 2000); occasional alcohol use (beer); no illicit drug use; works as a       Prior Cardiovascular Testing (Personally Reviewed):     CT cardiac scoring (April 2023)- left main 0; ; left circumflex 4.8; .8; total coronary calcium score 448; mildly ectatic ascending aorta (4.0 cm)     Lipid panel (June 2023)- total 191; HDL 46; ; triglycerides 162 mg/dL     EKG (April 2022)- Per my personal review interpretation normal sinus rhythm; normal ECG     Review of Systems:  Review of Systems   Constitutional: Negative.   HENT: Negative.     Eyes: Negative.    Cardiovascular:  Negative for chest pain, dyspnea on exertion, near-syncope, orthopnea, palpitations, paroxysmal nocturnal dyspnea and syncope.   Respiratory: Negative.     Endocrine: Negative.    Hematologic/Lymphatic: Negative.    Skin: Negative.    Musculoskeletal: Negative.    Gastrointestinal: Negative.    Genitourinary: Negative.    Neurological: Negative.    Psychiatric/Behavioral: Negative.     Allergic/Immunologic: Negative.        Outpatient Medications:    Current Outpatient Medications:     allopurinol (Zyloprim) 300 mg tablet, Take 1 tablet (300 mg) by mouth once daily. as directed, Disp: 90 tablet, Rfl: 3    coenzyme Q-10 200 mg capsule, Take 1 capsule (200 mg) by mouth once daily., Disp: , Rfl:     colchicine 0.6 mg tablet, Take by mouth twice a day., Disp: , Rfl:     levothyroxine (Synthroid, Levoxyl) 175 mcg tablet, TAKE 1 TABLET BY MOUTH EVERY DAY AS DIRECTED, Disp: 90 tablet, Rfl: 3    lisinopril 10 mg tablet, TAKE 1 TABLET BY MOUTH EVERY DAY AS DIRECTED, Disp: 90 tablet, Rfl: 3    rosuvastatin (Crestor) 5 mg tablet, Take 1 tablet (5 mg) by mouth once daily. as directed, Disp: 90 tablet, Rfl: 3     Last Recorded Vitals  /84 (BP Location: Left arm, Patient Position: Sitting, BP Cuff Size: Large adult)   Pulse 63   Wt 105 kg (232 lb)   SpO2 96%   BMI 32.82 kg/m²     Physical Exam:    Physical Exam  Constitutional:       General: He is not in acute distress.  HENT:      Head: Normocephalic.       "Mouth/Throat:      Mouth: Mucous membranes are moist.   Eyes:      Extraocular Movements: Extraocular movements intact.      Conjunctiva/sclera: Conjunctivae normal.   Neck:      Vascular: No JVD.   Cardiovascular:      Rate and Rhythm: Normal rate and regular rhythm.      Heart sounds: No murmur heard.  Pulmonary:      Effort: Pulmonary effort is normal. No respiratory distress.      Breath sounds: Normal breath sounds.   Abdominal:      General: Bowel sounds are normal. There is no distension.      Palpations: Abdomen is soft.   Musculoskeletal:         General: No swelling.   Skin:     General: Skin is warm and dry.   Neurological:      General: No focal deficit present.      Mental Status: He is alert.      Cranial Nerves: No cranial nerve deficit.      Motor: No weakness.   Psychiatric:         Mood and Affect: Mood normal.         Behavior: Behavior normal.         Lab/Radiology/Diagnostic Review:    Labs    Lab Results   Component Value Date    GLUCOSE 94 08/06/2024    CALCIUM 9.2 08/06/2024     08/06/2024    K 4.4 08/06/2024    CO2 29 08/06/2024     08/06/2024    BUN 13 08/06/2024    CREATININE 0.78 08/06/2024       Lab Results   Component Value Date    WBC 8.0 08/06/2024    HGB 16.1 08/06/2024    HCT 47.7 08/06/2024    MCV 93 08/06/2024     08/06/2024       Lab Results   Component Value Date    CHOL 166 08/06/2024    CHOL 175 12/11/2023    CHOL 191 06/14/2023     Lab Results   Component Value Date    HDL 45.6 08/06/2024    HDL 44.0 12/11/2023    HDL 46.6 06/14/2023     Lab Results   Component Value Date    LDLCALC 88 08/06/2024    LDLCALC 94 12/11/2023     Lab Results   Component Value Date    TRIG 161 (H) 08/06/2024    TRIG 184 (H) 12/11/2023    TRIG 162 (H) 06/14/2023     No components found for: \"CHOLHDL\"    No results found for: \"BNP\"    Lab Results   Component Value Date    TSH 1.87 08/06/2024       Assessment:   68 y.o.  male who presents today for a follow-up visit.Past " medical history of atherosclerotic heart disease. Elevated coronary calcium score, dyslipidemia, history of gout, ventral hernia, remote history of pleural effusion/empyema (2000).    Vitaly presented to cardiology clinic on 7/2/2024.  Patient is currently asymptomatic from a cardiac standpoint.  Continue cardiac management as below.  Patient has a history of mildly ectatic ascending aorta her CT imaging in April 2023; recommend repeat CT imaging of the aorta in 1 year or earlier if symptomatic.  Continue rosuvastatin repeat fasting lipid panel to assess LDL control.  Follow-up in 1 year or earlier if needed in cardiology clinic.    Overall Plan:  1. Atherosclerotic heart disease  - continue statin; encouraged regular physical activity; encouraged heart healthy diet/Mediterranean style diet; the patient develops any future cardiovascular complaints would then consider exercise stress echo     2. Dyslipidemia (goal LDL less than 100 mg/dL)  - continue rosuvastatin and uptitrate as tolerated; encouraged dietary and lifestyle modifications; if patient cannot tolerate rosuvastatin would then trial atorvastatin 20 mg nightly; the patient is unable to tolerate statin therapy would then consider Zetia     3. Hypertension) goal blood pressure less than 130/90 mmHg)  - continue lisinopril 10 mg daily and uptitrate as needed     4.  Mildly ectatic ascending aorta  - Monitor clinically and with serial imaging; no current indications for intervention  - Recommend repeat CT imaging in 1 year (2025) to reassess aortic size     5. Discussed red flag symptoms that should prompt immediate medical attention; patient verbalized understanding     6. Obesity- discussed need for weight loss; discussed dietary lifestyle modifications     Disposition- “return to cardiology clinic in 1 year or earlier if needed     Thank you for your visit today. Please contact our office with any questions.     Rafael Franks MD

## 2024-08-13 ENCOUNTER — APPOINTMENT (OUTPATIENT)
Dept: PRIMARY CARE | Facility: CLINIC | Age: 69
End: 2024-08-13
Payer: COMMERCIAL

## 2024-08-13 VITALS
BODY MASS INDEX: 32.76 KG/M2 | HEART RATE: 74 BPM | DIASTOLIC BLOOD PRESSURE: 80 MMHG | OXYGEN SATURATION: 95 % | HEIGHT: 71 IN | SYSTOLIC BLOOD PRESSURE: 132 MMHG | WEIGHT: 234 LBS

## 2024-08-13 DIAGNOSIS — M25.561 CHRONIC PAIN OF BOTH KNEES: ICD-10-CM

## 2024-08-13 DIAGNOSIS — I28.8 DILATION OF PULMONARY ARTERY (MULTI): ICD-10-CM

## 2024-08-13 DIAGNOSIS — E78.2 MIXED HYPERLIPIDEMIA: ICD-10-CM

## 2024-08-13 DIAGNOSIS — G89.29 CHRONIC PAIN OF BOTH KNEES: ICD-10-CM

## 2024-08-13 DIAGNOSIS — M25.69 BACK STIFFNESS: ICD-10-CM

## 2024-08-13 DIAGNOSIS — M79.661 PAIN IN BOTH LOWER LEGS: Primary | ICD-10-CM

## 2024-08-13 DIAGNOSIS — E03.9 ACQUIRED HYPOTHYROIDISM: ICD-10-CM

## 2024-08-13 DIAGNOSIS — I10 ESSENTIAL HYPERTENSION: ICD-10-CM

## 2024-08-13 DIAGNOSIS — M25.562 CHRONIC PAIN OF BOTH KNEES: ICD-10-CM

## 2024-08-13 DIAGNOSIS — M79.662 PAIN IN BOTH LOWER LEGS: Primary | ICD-10-CM

## 2024-08-13 PROCEDURE — 3079F DIAST BP 80-89 MM HG: CPT | Performed by: INTERNAL MEDICINE

## 2024-08-13 PROCEDURE — 3008F BODY MASS INDEX DOCD: CPT | Performed by: INTERNAL MEDICINE

## 2024-08-13 PROCEDURE — 3075F SYST BP GE 130 - 139MM HG: CPT | Performed by: INTERNAL MEDICINE

## 2024-08-13 PROCEDURE — 99214 OFFICE O/P EST MOD 30 MIN: CPT | Performed by: INTERNAL MEDICINE

## 2024-08-13 PROCEDURE — 1160F RVW MEDS BY RX/DR IN RCRD: CPT | Performed by: INTERNAL MEDICINE

## 2024-08-13 PROCEDURE — 1036F TOBACCO NON-USER: CPT | Performed by: INTERNAL MEDICINE

## 2024-08-13 PROCEDURE — 1159F MED LIST DOCD IN RCRD: CPT | Performed by: INTERNAL MEDICINE

## 2024-08-13 PROCEDURE — 1124F ACP DISCUSS-NO DSCNMKR DOCD: CPT | Performed by: INTERNAL MEDICINE

## 2024-08-13 ASSESSMENT — ENCOUNTER SYMPTOMS
PALPITATIONS: 0
TREMORS: 0
HYPERTENSION: 1
NERVOUS/ANXIOUS: 0
FATIGUE: 0
MYALGIAS: 0
BACK PAIN: 1
SHORTNESS OF BREATH: 0
LEG PAIN: 1
DEPRESSED MOOD: 0

## 2024-08-13 NOTE — PROGRESS NOTES
Subjective   Patient ID: Vitaly Puri is a 68 y.o. male who presents for Follow-up chronic medical problems.    Leg pain after on feet for 30-60 minutes.  Pain interferes with walking.  Knees feel stiff.  Starts back of thighs to lower legs.  Currently left leg pain at rest.  Lower leg pain, mild walking into building.  Symptoms worse past several months.  No back pain, occasional stiffness.  Mild swelling in legs after standing a few hours.  Does not wear compression stockings.  Meds and labs reviewed.    Hypertension  This is a chronic problem. The current episode started more than 1 year ago. The problem has been resolved since onset. The problem is controlled. Pertinent negatives include no chest pain, palpitations or shortness of breath. The current treatment provides significant improvement. There are no compliance problems.  Identifiable causes of hypertension include a thyroid problem.   Hyperlipidemia  This is a chronic problem. The current episode started more than 1 year ago. The problem is controlled. Exacerbating diseases include obesity. There are no known factors aggravating his hyperlipidemia. Associated symptoms include leg pain. Pertinent negatives include no chest pain, myalgias or shortness of breath. Current antihyperlipidemic treatment includes statins. The current treatment provides significant improvement of lipids. There are no compliance problems.    Thyroid Problem  Presents for follow-up visit. Patient reports no anxiety, depressed mood, fatigue, palpitations or tremors. The symptoms have been stable. His past medical history is significant for hyperlipidemia.        Review of Systems   Constitutional:  Negative for fatigue.   Respiratory:  Negative for shortness of breath.    Cardiovascular:  Negative for chest pain and palpitations.   Musculoskeletal:  Positive for back pain. Negative for myalgias.        Leg pain.  Knee pain.   Neurological:  Negative for tremors.  "  Psychiatric/Behavioral:  The patient is not nervous/anxious.        Objective   /80   Pulse 74   Ht 1.791 m (5' 10.5\")   Wt 106 kg (234 lb)   SpO2 95%   BMI 33.10 kg/m²     Physical Exam  Constitutional:       Appearance: Normal appearance. He is obese.   Neck:      Vascular: No carotid bruit.   Cardiovascular:      Rate and Rhythm: Normal rate and regular rhythm.      Pulses: Normal pulses.      Heart sounds: Normal heart sounds.   Pulmonary:      Effort: Pulmonary effort is normal.      Breath sounds: Normal breath sounds.   Neurological:      General: No focal deficit present.      Mental Status: He is alert.   Psychiatric:         Mood and Affect: Mood normal.         Behavior: Behavior normal.         Thought Content: Thought content normal.         Judgment: Judgment normal.         Assessment/Plan   Problem List Items Addressed This Visit             ICD-10-CM    Acquired hypothyroidism E03.9     Thyroid, free T4 and TSH deonte with levothyroxine or synthroid.  Take medication 30 to 60 minutes before breakfast.           Essential hypertension I10     BP at goal with current medications.  Rec low salt diet.  Check BP at home, goal < 140/90.           Mixed hyperlipidemia E78.2     Lipids, LDL at goal with statin.  Recommend low fat/cholesterol diet.           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  Will fu in 2025 with CT, cardiology following.         Pain in both lower legs - Primary M79.661, M79.662     Hold statin x 30 days.  Discussed compression stockings and foot wear.         Chronic pain of both knees M25.561, M25.562, G89.29     Rec xrays.         Relevant Orders    XR knee 4+ views bilateral    Back stiffness M25.69     Rec xrays.         Relevant Orders    XR lumbar spine 2-3 views          "

## 2024-08-20 ENCOUNTER — HOSPITAL ENCOUNTER (OUTPATIENT)
Dept: RADIOLOGY | Facility: CLINIC | Age: 69
Discharge: HOME | End: 2024-08-20
Payer: COMMERCIAL

## 2024-08-20 DIAGNOSIS — G89.29 CHRONIC PAIN OF BOTH KNEES: ICD-10-CM

## 2024-08-20 DIAGNOSIS — M25.561 CHRONIC PAIN OF BOTH KNEES: ICD-10-CM

## 2024-08-20 DIAGNOSIS — M25.69 BACK STIFFNESS: ICD-10-CM

## 2024-08-20 DIAGNOSIS — M25.562 CHRONIC PAIN OF BOTH KNEES: ICD-10-CM

## 2024-08-20 PROCEDURE — 72100 X-RAY EXAM L-S SPINE 2/3 VWS: CPT

## 2024-08-20 PROCEDURE — 73562 X-RAY EXAM OF KNEE 3: CPT | Mod: 50

## 2024-08-20 PROCEDURE — 72100 X-RAY EXAM L-S SPINE 2/3 VWS: CPT | Performed by: RADIOLOGY

## 2024-08-20 PROCEDURE — 73562 X-RAY EXAM OF KNEE 3: CPT | Mod: BILATERAL PROCEDURE | Performed by: RADIOLOGY

## 2024-09-04 ENCOUNTER — OFFICE VISIT (OUTPATIENT)
Dept: PRIMARY CARE | Facility: CLINIC | Age: 69
End: 2024-09-04
Payer: COMMERCIAL

## 2024-09-04 VITALS
HEIGHT: 70 IN | WEIGHT: 233 LBS | BODY MASS INDEX: 33.36 KG/M2 | SYSTOLIC BLOOD PRESSURE: 126 MMHG | HEART RATE: 72 BPM | OXYGEN SATURATION: 96 % | DIASTOLIC BLOOD PRESSURE: 80 MMHG

## 2024-09-04 DIAGNOSIS — M25.561 CHRONIC PAIN OF BOTH KNEES: Primary | ICD-10-CM

## 2024-09-04 DIAGNOSIS — G89.29 CHRONIC PAIN OF BOTH KNEES: Primary | ICD-10-CM

## 2024-09-04 DIAGNOSIS — M25.562 CHRONIC PAIN OF BOTH KNEES: Primary | ICD-10-CM

## 2024-09-04 DIAGNOSIS — M17.0 PRIMARY OSTEOARTHRITIS OF BOTH KNEES: ICD-10-CM

## 2024-09-04 PROCEDURE — 1160F RVW MEDS BY RX/DR IN RCRD: CPT | Performed by: INTERNAL MEDICINE

## 2024-09-04 PROCEDURE — 3074F SYST BP LT 130 MM HG: CPT | Performed by: INTERNAL MEDICINE

## 2024-09-04 PROCEDURE — 99213 OFFICE O/P EST LOW 20 MIN: CPT | Performed by: INTERNAL MEDICINE

## 2024-09-04 PROCEDURE — 3008F BODY MASS INDEX DOCD: CPT | Performed by: INTERNAL MEDICINE

## 2024-09-04 PROCEDURE — 3079F DIAST BP 80-89 MM HG: CPT | Performed by: INTERNAL MEDICINE

## 2024-09-04 PROCEDURE — 1159F MED LIST DOCD IN RCRD: CPT | Performed by: INTERNAL MEDICINE

## 2024-09-04 PROCEDURE — 1036F TOBACCO NON-USER: CPT | Performed by: INTERNAL MEDICINE

## 2024-09-04 RX ORDER — MELOXICAM 7.5 MG/1
7.5 TABLET ORAL 2 TIMES DAILY PRN
Qty: 30 TABLET | Refills: 3 | Status: SHIPPED | OUTPATIENT
Start: 2024-09-04

## 2024-09-04 ASSESSMENT — ENCOUNTER SYMPTOMS
DIZZINESS: 0
SHORTNESS OF BREATH: 0
FATIGUE: 0

## 2024-09-04 NOTE — PROGRESS NOTES
"Subjective   Patient ID: Vitaly Puri is a 68 y.o. male who presents for Follow-up chronic medical problems.    Xrays reviewed with pt.  Advanced OA L spine and bilateral knees, worse medial aspect.  Knees stiff in morning.  Legs \"stiffen up\" after standing.  Will take a break every 30 minutes if able.  Tightness posterior knees.  Otc motrin as needed with relief.  Reviewed treatment options.         Review of Systems   Constitutional:  Negative for fatigue.   Respiratory:  Negative for shortness of breath.    Cardiovascular:  Negative for chest pain.   Musculoskeletal:         Bilateral knee pain.   Neurological:  Negative for dizziness.       Objective   /80 (BP Location: Right arm, Patient Position: Sitting)   Pulse 72   Ht 1.778 m (5' 10\")   Wt 106 kg (233 lb)   SpO2 96%   BMI 33.43 kg/m²     Physical Exam  Constitutional:       Appearance: Normal appearance.   Musculoskeletal:         General: Swelling and deformity present.   Neurological:      General: No focal deficit present.      Mental Status: He is alert.   Psychiatric:         Mood and Affect: Mood normal.         Behavior: Behavior normal.         Thought Content: Thought content normal.         Judgment: Judgment normal.         Assessment/Plan     Stop otc motrin.  Rec meloxicam 7.5 mg twice daily for OA knees and chronic pain.  Rec ortho consult.  Fu next ov.    Problem List Items Addressed This Visit             ICD-10-CM    Chronic pain of both knees - Primary M25.561, M25.562, G89.29    Relevant Medications    meloxicam (Mobic) 7.5 mg tablet    Other Relevant Orders    Referral to Orthopaedic Surgery    Primary osteoarthritis of both knees M17.0    Relevant Medications    meloxicam (Mobic) 7.5 mg tablet    Other Relevant Orders    Referral to Orthopaedic Surgery          "

## 2024-10-01 ENCOUNTER — APPOINTMENT (OUTPATIENT)
Dept: PRIMARY CARE | Facility: CLINIC | Age: 69
End: 2024-10-01
Payer: COMMERCIAL

## 2024-11-20 ENCOUNTER — APPOINTMENT (OUTPATIENT)
Dept: PRIMARY CARE | Facility: CLINIC | Age: 69
End: 2024-11-20
Payer: COMMERCIAL

## 2024-12-24 DIAGNOSIS — E78.2 MIXED HYPERLIPIDEMIA: ICD-10-CM

## 2024-12-24 DIAGNOSIS — M1A.0710 IDIOPATHIC CHRONIC GOUT OF RIGHT FOOT WITHOUT TOPHUS: ICD-10-CM

## 2024-12-24 RX ORDER — ALLOPURINOL 300 MG/1
300 TABLET ORAL DAILY
Qty: 90 TABLET | Refills: 3 | Status: SHIPPED | OUTPATIENT
Start: 2024-12-24

## 2024-12-24 RX ORDER — ROSUVASTATIN CALCIUM 5 MG/1
5 TABLET, COATED ORAL DAILY
Qty: 90 TABLET | Refills: 3 | Status: SHIPPED | OUTPATIENT
Start: 2024-12-24

## 2024-12-31 DIAGNOSIS — I10 ESSENTIAL (PRIMARY) HYPERTENSION: ICD-10-CM

## 2025-01-02 RX ORDER — LISINOPRIL 10 MG/1
TABLET ORAL
Qty: 90 TABLET | Refills: 3 | Status: SHIPPED | OUTPATIENT
Start: 2025-01-02

## 2025-01-15 ENCOUNTER — EVALUATION (OUTPATIENT)
Dept: PHYSICAL THERAPY | Facility: CLINIC | Age: 70
End: 2025-01-15
Payer: COMMERCIAL

## 2025-01-15 DIAGNOSIS — M25.562 CHRONIC PAIN OF BOTH KNEES: ICD-10-CM

## 2025-01-15 DIAGNOSIS — R26.2 DIFFICULTY WALKING: Primary | ICD-10-CM

## 2025-01-15 DIAGNOSIS — M25.561 CHRONIC PAIN OF BOTH KNEES: ICD-10-CM

## 2025-01-15 DIAGNOSIS — G89.29 CHRONIC PAIN OF BOTH KNEES: ICD-10-CM

## 2025-01-15 DIAGNOSIS — M54.16 LUMBAR RADICULOPATHY: ICD-10-CM

## 2025-01-15 PROCEDURE — 97110 THERAPEUTIC EXERCISES: CPT | Mod: GP | Performed by: PHYSICAL THERAPIST

## 2025-01-15 PROCEDURE — 97161 PT EVAL LOW COMPLEX 20 MIN: CPT | Mod: GP | Performed by: PHYSICAL THERAPIST

## 2025-01-15 SDOH — ECONOMIC STABILITY: FOOD INSECURITY: WITHIN THE PAST 12 MONTHS, YOU WORRIED THAT YOUR FOOD WOULD RUN OUT BEFORE YOU GOT MONEY TO BUY MORE.: NEVER TRUE

## 2025-01-15 SDOH — ECONOMIC STABILITY: FOOD INSECURITY: WITHIN THE PAST 12 MONTHS, THE FOOD YOU BOUGHT JUST DIDN'T LAST AND YOU DIDN'T HAVE MONEY TO GET MORE.: NEVER TRUE

## 2025-01-15 ASSESSMENT — PAIN SCALES - GENERAL: PAINLEVEL_OUTOF10: 1

## 2025-01-15 ASSESSMENT — ENCOUNTER SYMPTOMS
LOSS OF SENSATION IN FEET: 0
OCCASIONAL FEELINGS OF UNSTEADINESS: 0
DEPRESSION: 0

## 2025-01-15 ASSESSMENT — PAIN DESCRIPTION - DESCRIPTORS: DESCRIPTORS: TIGHTNESS;ACHING;THROBBING

## 2025-01-15 ASSESSMENT — LIFESTYLE VARIABLES
HOW OFTEN DO YOU HAVE SIX OR MORE DRINKS ON ONE OCCASION: MONTHLY
HOW OFTEN DO YOU HAVE A DRINK CONTAINING ALCOHOL: 4 OR MORE TIMES A WEEK
SKIP TO QUESTIONS 9-10: 0
AUDIT-C TOTAL SCORE: 7
HOW MANY STANDARD DRINKS CONTAINING ALCOHOL DO YOU HAVE ON A TYPICAL DAY: 3 OR 4

## 2025-01-15 ASSESSMENT — PATIENT HEALTH QUESTIONNAIRE - PHQ9
2. FEELING DOWN, DEPRESSED OR HOPELESS: NOT AT ALL
1. LITTLE INTEREST OR PLEASURE IN DOING THINGS: NOT AT ALL
SUM OF ALL RESPONSES TO PHQ9 QUESTIONS 1 AND 2: 0

## 2025-01-15 ASSESSMENT — PAIN - FUNCTIONAL ASSESSMENT: PAIN_FUNCTIONAL_ASSESSMENT: 0-10

## 2025-01-23 ENCOUNTER — APPOINTMENT (OUTPATIENT)
Dept: PHYSICAL THERAPY | Facility: CLINIC | Age: 70
End: 2025-01-23
Payer: COMMERCIAL

## 2025-01-28 ENCOUNTER — TREATMENT (OUTPATIENT)
Dept: PHYSICAL THERAPY | Facility: CLINIC | Age: 70
End: 2025-01-28
Payer: COMMERCIAL

## 2025-01-28 DIAGNOSIS — G89.29 CHRONIC PAIN OF BOTH KNEES: ICD-10-CM

## 2025-01-28 DIAGNOSIS — M54.16 LUMBAR RADICULOPATHY: ICD-10-CM

## 2025-01-28 DIAGNOSIS — R26.2 DIFFICULTY WALKING: ICD-10-CM

## 2025-01-28 DIAGNOSIS — M25.561 CHRONIC PAIN OF BOTH KNEES: ICD-10-CM

## 2025-01-28 DIAGNOSIS — M25.562 CHRONIC PAIN OF BOTH KNEES: ICD-10-CM

## 2025-01-28 PROCEDURE — 97110 THERAPEUTIC EXERCISES: CPT | Mod: GP | Performed by: PHYSICAL THERAPIST

## 2025-01-28 NOTE — PROGRESS NOTES
Physical Therapy    Physical Therapy Treatment    Patient Name: Vitaly Puri  MRN: [id number]  Today's Date: 1/28/2025  Visit #2  Time Calculation  Start Time: 1448  Stop Time: 1526  Time Calculation (min): 38 min     PT Therapeutic Procedures Time Entry  Therapeutic Exercise Time Entry: 38        Payor: [employer] / Plan: [facility] MED / Product Type: *No Product type* /   Referred by:Gilmar Campuzano  Current Problem  Problem List Items Addressed This Visit             ICD-10-CM    Chronic pain of both knees M25.561, M25.562, G89.29    Difficulty walking R26.2    Lumbar radiculopathy M54.16        Subjective   Pt reports he worked 5 hours today and his legs are really feeling it.  Having a hard time walking  Pain: No change  Pt has been compliant with HEP Yes      Objective  No objective measures assessed this visit    Assessment:  Pt is just beginning therapy.  No goals met. All exercises were new or carried forward from eval.   The pt required min to mod cues and demonstration to complete exercises with proper form.    Pt responded to all activities without adverse effects.    Pt continues to lack ROM and strength necessary for the completion of baseline ADLs without pain.  Pt will benefit from further therapy to continue to progress towards meeting functional and impairment goals.     Plan:  Continue to progress treatment to improve strength, range of motion, joint mobility, pain, and flexibility impairments which are impacting patient's function.    Treatments:  Visit # 2    Auth:none  Precautions: None  Therapeutic ex:  Upright bike 5 min  Standing HS stretch 1 min ea  Standing step stretch 1 min ea  Standing hip abd and ext Rtb x 20  Supine quad sets 5 sec x 1 min  SLR x 20 ea  Supine hip add and abd 5 sec x 2 min  Bridges x 2 min  Update HEP NV  Neuro Re-ed:    Manual:none  Pt educated regarding manual therapy risks and benefits.  Pt given opportunity to stop if needed.  Pt aware and  agrees.     Goals:  Active       PT Problem       Reduce pain at worst to 3-4/10 with all prior activity.        Start:  01/15/25    Expected End:  02/05/25            Pt to sit with good posture approx 50-75% of the time.        Start:  01/15/25    Expected End:  02/05/25            Reduce pain at worst to 1-2/10 with all prior activity.        Start:  01/15/25    Expected End:  02/26/25            Increase knee flexion to 140 degrees and ext to 0 R LE, -5 L LE degrees for gait, stairs, and ADL's.        Start:  01/15/25    Expected End:  02/26/25            Pt to increase LE strength to grossly 5/5 for improved gait, stairs, lifting and ADL's.        Start:  01/15/25    Expected End:  02/26/25            Pt to score an increase of 10 points or > on LEFS to display overall increased function.         Start:  01/15/25    Expected End:  02/26/25            Pt to be independent with a HEP for self management.        Start:  01/15/25    Expected End:  02/26/25

## 2025-02-05 ENCOUNTER — TREATMENT (OUTPATIENT)
Dept: PHYSICAL THERAPY | Facility: CLINIC | Age: 70
End: 2025-02-05
Payer: COMMERCIAL

## 2025-02-05 DIAGNOSIS — G89.29 CHRONIC PAIN OF BOTH KNEES: ICD-10-CM

## 2025-02-05 DIAGNOSIS — M25.561 CHRONIC PAIN OF BOTH KNEES: ICD-10-CM

## 2025-02-05 DIAGNOSIS — M54.16 LUMBAR RADICULOPATHY: ICD-10-CM

## 2025-02-05 DIAGNOSIS — M25.562 CHRONIC PAIN OF BOTH KNEES: ICD-10-CM

## 2025-02-05 DIAGNOSIS — R26.2 DIFFICULTY WALKING: ICD-10-CM

## 2025-02-05 PROCEDURE — 97110 THERAPEUTIC EXERCISES: CPT | Mod: GP | Performed by: PHYSICAL THERAPIST

## 2025-02-05 PROCEDURE — 97140 MANUAL THERAPY 1/> REGIONS: CPT | Mod: GP | Performed by: PHYSICAL THERAPIST

## 2025-02-05 ASSESSMENT — PAIN - FUNCTIONAL ASSESSMENT: PAIN_FUNCTIONAL_ASSESSMENT: 0-10

## 2025-02-05 ASSESSMENT — PAIN SCALES - GENERAL: PAINLEVEL_OUTOF10: 2

## 2025-02-05 NOTE — PROGRESS NOTES
Physical Therapy    Physical Therapy Treatment    Patient Name: Vitaly Puri  MRN: [id number]  Today's Date: 2/5/2025  Time Calculation  Start Time: 0830  Stop Time: 0915  Time Calculation (min): 45 min     PT Therapeutic Procedures Time Entry  Manual Therapy Time Entry: 10  Therapeutic Exercise Time Entry: 35                 Payor: [employer] [facility] / Plan: [employer] / Product Type: *No Product type* /     Reason for Referral: B knee pain, OA  Referred By: Justen  General Comment: Visit 3 of [place]    Current Problem    Problem List Items Addressed This Visit             ICD-10-CM    Chronic pain of both knees M25.561, M25.562, G89.29    Difficulty walking R26.2    Lumbar radiculopathy M54.16        Subjective   Pt notes he feels his knees are about the same but note snot doing his HEP as much as he should.   Compliance with HEP-yes-partially     Precautions  Precautions  STEADI Fall Risk Score (The score of 4 or more indicates an increased risk of falling): no change  Precautions Comment: none    Pain  Pain Assessment: 0-10  0-10 (Numeric) Pain Score: 2 (5-6/10 at worst knees)  Pain Location: Knee  Pain Orientation: Right, Left    Objective   No measures     Treatments:  Therapeutic ex: x 35 min   Upright bike 5 min  Standing HS stretch 1 min ea  Incline calf stretch x 1 min   Standing hip abd, ext, flex B LE RTB 2 x 10  TKE standing GTB 2 x 10 B LE   Wall slides 2 x 10   SLR x 20 ea  Supine hip add and abd 5 sec x 2 min  Bridges x 2 min     Manual Tx: x 10 min   Manual ext stretch./mobs B knee, patellar mobs all ranges grade 2-3   Informed consent given on manual treatment. Pt given opportunity to cease treatment at any time. Educated pt on expected soreness, possible ecchymosis. Pt voiced understanding  No adverse effects were noted post tx.      Current HEP:  Access Code: [email or web address]  URL: [email or web address]  Date: 01/15/2025  Prepared by: FLAKITO Keenan  -  Seated Flexion Stretch  - 1 x daily - 7 x weekly - 2 sets - 10 reps  - Supine Lower Trunk Rotation  - 1 x daily - 7 x weekly - 2 sets - 10 reps  - Seated Hamstring Stretch  - 1 x daily - 7 x weekly - 1 sets - 3 reps - 30 sec hold  - Supine Quad Set  - 1 x daily - 7 x weekly - 2 sets - 10 reps  - Active Straight Leg Raise with Quad Set (Mirrored)  - 1 x daily - 7 x weekly - 2 sets - 10 reps  - Supine Hip Adduction Isometric with Ball  - 1 x daily - 7 x weekly - 2 sets - 10 reps - 5 sec hold  - Standing Gastroc Stretch at Counter  - 1 x daily - 7 x weekly - 1 sets - 3 reps - 30 sec hold    Assessment:   Overall pt tolerated treatment well today and progressed with program with no significant pain reported. Overall noted improved flexibility post treatment with standing and able to extend knee better.     Plan:   Continue to progress BW activity and ROM in the B knees as tolerated.     Goals:  Active       PT Problem       Reduce pain at worst to 3-4/10 with all prior activity.        Start:  01/15/25    Expected End:  02/05/25            Pt to sit with good posture approx 50-75% of the time.        Start:  01/15/25    Expected End:  02/05/25            Reduce pain at worst to 1-2/10 with all prior activity.        Start:  01/15/25    Expected End:  02/26/25            Increase knee flexion to 140 degrees and ext to 0 R LE, -5 L LE degrees for gait, stairs, and ADL's.        Start:  01/15/25    Expected End:  02/26/25            Pt to increase LE strength to grossly 5/5 for improved gait, stairs, lifting and ADL's.        Start:  01/15/25    Expected End:  02/26/25            Pt to score an increase of 10 points or > on LEFS to display overall increased function.         Start:  01/15/25    Expected End:  02/26/25            Pt to be independent with a HEP for self management.        Start:  01/15/25    Expected End:  02/26/25

## 2025-02-12 ENCOUNTER — APPOINTMENT (OUTPATIENT)
Dept: PHYSICAL THERAPY | Facility: CLINIC | Age: 70
End: 2025-02-12
Payer: COMMERCIAL

## 2025-02-19 ENCOUNTER — TREATMENT (OUTPATIENT)
Dept: PHYSICAL THERAPY | Facility: CLINIC | Age: 70
End: 2025-02-19
Payer: COMMERCIAL

## 2025-02-19 DIAGNOSIS — G89.29 CHRONIC PAIN OF BOTH KNEES: ICD-10-CM

## 2025-02-19 DIAGNOSIS — M25.561 CHRONIC PAIN OF BOTH KNEES: ICD-10-CM

## 2025-02-19 DIAGNOSIS — M25.562 CHRONIC PAIN OF BOTH KNEES: ICD-10-CM

## 2025-02-19 DIAGNOSIS — M54.16 LUMBAR RADICULOPATHY: ICD-10-CM

## 2025-02-19 DIAGNOSIS — R26.2 DIFFICULTY WALKING: Primary | ICD-10-CM

## 2025-02-19 PROCEDURE — 97140 MANUAL THERAPY 1/> REGIONS: CPT | Mod: GP | Performed by: PHYSICAL THERAPIST

## 2025-02-19 PROCEDURE — 97110 THERAPEUTIC EXERCISES: CPT | Mod: GP | Performed by: PHYSICAL THERAPIST

## 2025-02-19 ASSESSMENT — PAIN SCALES - GENERAL: PAINLEVEL_OUTOF10: 2

## 2025-02-19 ASSESSMENT — PAIN - FUNCTIONAL ASSESSMENT: PAIN_FUNCTIONAL_ASSESSMENT: 0-10

## 2025-02-19 NOTE — PROGRESS NOTES
Physical Therapy    Physical Therapy Treatment    Patient Name: Vitaly Puri  MRN: [id number]  Today's Date: 2/19/2025                          Payor: [employer] [facility] / Plan: [employer] / Product Type: *No Product type* /     Reason for Referral: B knee pain, OA  Referred By: Justen  General Comment: Visit 4 of MN    Current Problem    Problem List Items Addressed This Visit             ICD-10-CM    Chronic pain of both knees M25.561, M25.562, G89.29    Difficulty walking - Primary R26.2    Lumbar radiculopathy M54.16        Subjective   Pt overall notes still pain in the knees. Notes more stiffness in the mornings. 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   Compliance with HEP-yes    Precautions  Precautions  STEADI Fall Risk Score (The score of 4 or more indicates an increased risk of falling): no change  Precautions Comment: none    Pain  Pain Assessment: 0-10  0-10 (Numeric) Pain Score: 2  Pain Location: Knee  Pain Orientation: Right, Left    Objective   No measures     Treatments:  Therapeutic ex: x 35 min   Upright bike 5 min  Standing HS stretch 1 min ea  Incline calf stretch x 1 min  1/2 foam calf raises 2 x 10   1/2 foam toe raises  2 x 10   Standing hip abd, ext, flex B LE RTB 2 x 10  TKE standing GTB 2 x 10 B LE   Wall slides 2 x 10   Tandem stand x 1 min ea   SLR x 20 ea  Supine hip add and abd 5 sec x 2 min  Bridges x 2 min     Manual Tx: x 10 min   Manual ext stretch./mobs B knee, patellar mobs all ranges grade 2-3   Informed consent given on manual treatment. Pt given opportunity to cease treatment at any time. Educated pt on expected soreness, possible ecchymosis. Pt voiced understanding  No adverse effects were noted post tx.      Current HEP:  Access Code: [email or web address]  URL: [email or web address]  Date: 01/15/2025  Prepared by: FLAKITO Darden    Exercises  - Seated Flexion Stretch  - 1 x daily - 7 x weekly - 2 sets - 10  reps  - Supine Lower Trunk Rotation  - 1 x daily - 7 x weekly - 2 sets - 10 reps  - Seated Hamstring Stretch  - 1 x daily - 7 x weekly - 1 sets - 3 reps - 30 sec hold  - Supine Quad Set  - 1 x daily - 7 x weekly - 2 sets - 10 reps  - Active Straight Leg Raise with Quad Set (Mirrored)  - 1 x daily - 7 x weekly - 2 sets - 10 reps  - Supine Hip Adduction Isometric with Ball  - 1 x daily - 7 x weekly - 2 sets - 10 reps - 5 sec hold  - Standing Gastroc Stretch at Counter  - 1 x daily - 7 x weekly - 1 sets - 3 reps - 30 sec hold    Assessment:   Overall still tightness into ext noted but tolerated manual mobs well with less stiffness noted post tx. Pt overall recommended to take more rest periods when standing of long periods when cooking.     Plan:   Continue to progress strength and WB activity as tolerated.     Goals:  Active       PT Problem       Reduce pain at worst to 3-4/10 with all prior activity.        Start:  01/15/25    Expected End:  02/05/25            Pt to sit with good posture approx 50-75% of the time.        Start:  01/15/25    Expected End:  02/05/25            Reduce pain at worst to 1-2/10 with all prior activity.        Start:  01/15/25    Expected End:  02/26/25            Increase knee flexion to 140 degrees and ext to 0 R LE, -5 L LE degrees for gait, stairs, and ADL's.        Start:  01/15/25    Expected End:  02/26/25            Pt to increase LE strength to grossly 5/5 for improved gait, stairs, lifting and ADL's.        Start:  01/15/25    Expected End:  02/26/25            Pt to score an increase of 10 points or > on LEFS to display overall increased function.         Start:  01/15/25    Expected End:  02/26/25            Pt to be independent with a HEP for self management.        Start:  01/15/25    Expected End:  02/26/25

## 2025-02-19 NOTE — LETTER
February 19, 2025    Gilmar Campuzano DO  [address]  [address]  [place] [address]    Patient: Vitaly Puri   YOB: 1955   Date of Visit: 2/19/2025       Dear Gilmar Campuzano DO  [address]  [address]  [place] [address]    The attached plan of care is being sent to you because your patient’s medical reimbursement requires that you certify the plan of care. Your signature is required to allow uninterrupted insurance coverage.      You may indicate your approval by signing below and faxing this form back to us at Dept Fax: [phone].    Please call Dept: [phone] with any questions or concerns.    Thank you for this referral,        Antonio Henao, PT  [facility] [address]  [address]  [place] [address]    Payer: Payor: [employer] [place] / Plan: [employer] / Product Type: *No Product type* /                                                                         Date:     Dear Antonio Henao, PT,     Re: Mr. Vitaly Puri, MRN:[id number]    I certify that I have reviewed the attached plan of care and it is medically necessary for Mr. Vitaly Puri (1955) who is under my care.          ______________________________________                    _________________  Provider name and credentials                                           Date and time                                                                                           Plan of Care 1/15/25   Effective from: 1/15/2025  Effective to: 4/13/2025    Plan ID: [id number]                Participants as of Finalize on 2/5/2025      Name Type Comments Contact Info    Gilmar Campuzano DO Referring Provider  [phone]           Last Plan Note       Author: Antonio Henao, PT Status: Signed Last edited: 1/15/2025  9:15 AM         Physical Therapy    Physical Therapy Lumbar Spine Evaluation    Patient Name: Vitaly Puri  MRN: [id number]  Today's Date: 1/15/2025  Time Calculation  Start  Time: 0915  Stop Time: 1000  Time Calculation (min): 45 min  PT Evaluation Time Entry  PT Evaluation (Low) Time Entry: 35  PT Therapeutic Procedures Time Entry  Therapeutic Exercise Time Entry: 10  Payor: MEDICAL [facility] / Plan: [employer] / Product Type: *No Product type* /     Reason for Referral: B knee pain, OA  Referred By: Justen Huff Comment: Visit 1 of [place]    Current Problem  Problem List Items Addressed This Visit             ICD-10-CM    Chronic pain of both knees M25.561, M25.562, G89.29    Relevant Orders    Follow Up In Physical Therapy     Other Visit Diagnoses         Codes    Difficulty walking    -  Primary R26.2    Relevant Orders    Follow Up In Physical Therapy    Lumbar radiculopathy     M54.16    Relevant Orders    Follow Up In Physical Therapy           Precautions  Precautions  STEADI Fall Risk Score (The score of 4 or more indicates an increased risk of falling): 0  Precautions Comment: none     Pain  Pain Assessment: 0-10  0-10 (Numeric) Pain Score: 1 (6/10 at worst in B knees. 3/10 at worst low back)  Pain Location: Knee  Pain Orientation: Right, Left  Pain Descriptors: Tightness, Aching, Throbbing  Pain Frequency: Intermittent  Pain Onset: Ongoing  Clinical Progression: Gradually worsening    SUBJECTIVE:   Chief complaint:  Pt reports he has had a hx of B knee pain for several years but notes recently went for cortisone injections and feels did not help. Notes shortly after cortisone, he started to have low back pain. Notes more recently less back pain but more stiffness/and tightness. Notes knee pain is worse with weight bearing and activity, better at rest. Denies pain radiation from low back in to the LE's. Denies numbness and tingling in the LE's.     Imaging: x-rays August 2024 showed B knee OA medial compartments, DD of lumbar spine and facet arthrosis     Pain Better: rest and sitting for the knees, laying flat on back for low back pain.     Pain Worse:  pain worse with activity and weight bearing B knees.     Prior level of function: previously independent with all activity including weight bearing prolonged.    Current limitations: prolonged standing/sitting, stairs, squatting.     Home setup: reviewed and no concern     Work: part time-    Patients goal:relieve knee pain and strengthen back     Prior tx: cortisone injection B knee about 14 week ago    Medical hx has been reviewed with the patient.     Objective:    Lower Extremity ROM: WNL unless documented below:  Date: eval RIGHT LEFT   Hip Flexion     Hip abd     Hip add     Hip ext     Knee Extension -3 -10   Knee Flexion  133   Ankle DF     Ankle PF       Lower Extremity Strength:  Date: eval Myotome RIGHT LEFT   Hip Flexion L1,2 4+ 4+   Hip ext  4+ 4+   Hip abd  4+ 4+   Hip add  4+ 4+   Knee Extension L3 4 4   Knee Flexion  4 4   Ankle DF L4 4+ 4+   Ankle PF S1 4+ 4+     Lumbar ROM:  Date: eval Percentage    Flexion 75%    Extension 50%     RIGHT LEFT   Side Bend 50% 50%   Rotation 50% 50%     Joint mobility: Patella all ranges mildly hypomobile  Posture: Flattened lumbar spine, flexed knee while standing L > R  Palpation: no significant tenderness.     Special tests:  Lumbar: (-) SLR but significant HS tightness   Harper Repeated Movements: all negative    Other Measures  Lower Extremity Funtional Score (LEFS): 36/80     TREATMENT:  Eval  2. Instruction in HEP:  Access Code: [email or web address]  URL: [email or web address]  Date: 01/15/2025  Prepared by: FLAKITO Darden    Exercises  - Seated Flexion Stretch  - 1 x daily - 7 x weekly - 2 sets - 10 reps  - Supine Lower Trunk Rotation  - 1 x daily - 7 x weekly - 2 sets - 10 reps  - Seated Hamstring Stretch  - 1 x daily - 7 x weekly - 1 sets - 3 reps - 30 sec hold  - Supine Quad Set  - 1 x daily - 7 x weekly - 2 sets - 10 reps  - Active Straight Leg Raise with Quad Set (Mirrored)  - 1 x daily - 7 x weekly - 2 sets - 10 reps  - Supine Hip  Adduction Isometric with Ball  - 1 x daily - 7 x weekly - 2 sets - 10 reps - 5 sec hold  - Standing Gastroc Stretch at Counter  - 1 x daily - 7 x weekly - 1 sets - 3 reps - 30 sec hold    ASSESSEMENT  Pt is a 69 y.o. referred to therapy for dx of LBP and B knee pain by Gilmar Campuzano DO . Pt presents with pain, loss of motion, loss of strength, reduced posture and reduced function. Pt with signs and symptoms associated with pain of a possible degenerative change in the spine and B knees.  This pt would benefit from a therapy program to restore prior level of function, decrease pain, increase AROM, increase strength and improve posture.  Complexity: Low  Clinical presentation: Stable and/or uncomplicated characteristics  The physical therapy prognosis is good for the patient to achieve their goals.   The pt tolerated therapy treatment today well with no adverse effects.  Personal Factors/Barriers to therapy include:  none       PLAN  The pt will be seen 2 days a week for 6 weeks.      The pt has been educated about the risks and benefits of physical therapy including manual therapy treatments. Pt agrees with POC and gives consent for treatment.     The patient will benefit from physical therapy treatment to include: therapeutic exercises, therapeutic activities, neurological re-education, manual therapy, modalities, and a home exercise program.     Goals:  Active       PT Problem       Reduce pain at worst to 3-4/10 with all prior activity.        Start:  01/15/25    Expected End:  02/05/25            Pt to sit with good posture approx 50-75% of the time.        Start:  01/15/25    Expected End:  02/05/25            Reduce pain at worst to 1-2/10 with all prior activity.        Start:  01/15/25    Expected End:  02/26/25            Increase knee flexion to 140 degrees and ext to 0 R LE, -5 L LE degrees for gait, stairs, and ADL's.        Start:  01/15/25    Expected End:  02/26/25            Pt to increase LE  strength to grossly 5/5 for improved gait, stairs, lifting and ADL's.        Start:  01/15/25    Expected End:  02/26/25            Pt to score an increase of 10 points or > on LEFS to display overall increased function.         Start:  01/15/25    Expected End:  02/26/25            Pt to be independent with a HEP for self management.        Start:  01/15/25    Expected End:  02/26/25                      Current Participants as of 2/19/2025      Name Type Comments Contact Info    Gilmar Campuzano DO Referring Provider  [phone]    Signature pending

## 2025-02-26 ENCOUNTER — APPOINTMENT (OUTPATIENT)
Dept: PHYSICAL THERAPY | Facility: CLINIC | Age: 70
End: 2025-02-26
Payer: COMMERCIAL

## 2025-03-03 ENCOUNTER — APPOINTMENT (OUTPATIENT)
Dept: PRIMARY CARE | Facility: CLINIC | Age: 70
End: 2025-03-03
Payer: COMMERCIAL

## 2025-03-03 ENCOUNTER — TELEPHONE (OUTPATIENT)
Dept: PRIMARY CARE | Facility: CLINIC | Age: 70
End: 2025-03-03

## 2025-03-03 DIAGNOSIS — E78.2 MIXED HYPERLIPIDEMIA: ICD-10-CM

## 2025-03-03 DIAGNOSIS — E03.9 HYPOTHYROIDISM, UNSPECIFIED: ICD-10-CM

## 2025-03-03 DIAGNOSIS — I10 ESSENTIAL HYPERTENSION: ICD-10-CM

## 2025-03-03 DIAGNOSIS — E03.9 ACQUIRED HYPOTHYROIDISM: Primary | ICD-10-CM

## 2025-03-03 NOTE — TELEPHONE ENCOUNTER
Pt cx appt today due to not having labs done, I informed pt no labs are ordered, pt wishes to see you later so he can have bloodwork done, please advise  I set him up to see you  3/26

## 2025-03-04 RX ORDER — LEVOTHYROXINE SODIUM 175 UG/1
TABLET ORAL
Qty: 90 TABLET | Refills: 3 | Status: SHIPPED | OUTPATIENT
Start: 2025-03-04

## 2025-03-05 ENCOUNTER — APPOINTMENT (OUTPATIENT)
Dept: PHYSICAL THERAPY | Facility: CLINIC | Age: 70
End: 2025-03-05
Payer: COMMERCIAL

## 2025-03-05 LAB
ALT SERPL-CCNC: 33 U/L (ref 9–46)
ANION GAP SERPL CALCULATED.4IONS-SCNC: 9 MMOL/L (CALC) (ref 7–17)
BUN SERPL-MCNC: 10 MG/DL (ref 7–25)
BUN/CREAT SERPL: 16 (CALC) (ref 6–22)
CALCIUM SERPL-MCNC: 9.2 MG/DL (ref 8.6–10.3)
CHLORIDE SERPL-SCNC: 104 MMOL/L (ref 98–110)
CHOLEST SERPL-MCNC: 180 MG/DL
CHOLEST/HDLC SERPL: 3.8 (CALC)
CO2 SERPL-SCNC: 26 MMOL/L (ref 20–32)
CREAT SERPL-MCNC: 0.63 MG/DL (ref 0.7–1.35)
EGFRCR SERPLBLD CKD-EPI 2021: 103 ML/MIN/1.73M2
GLUCOSE SERPL-MCNC: 88 MG/DL (ref 65–99)
HDLC SERPL-MCNC: 47 MG/DL
LDLC SERPL CALC-MCNC: 99 MG/DL (CALC)
NONHDLC SERPL-MCNC: 133 MG/DL (CALC)
POTASSIUM SERPL-SCNC: 4.8 MMOL/L (ref 3.5–5.3)
SODIUM SERPL-SCNC: 139 MMOL/L (ref 135–146)
T4 FREE SERPL-MCNC: 1.8 NG/DL (ref 0.8–1.8)
TRIGL SERPL-MCNC: 226 MG/DL
TSH SERPL-ACNC: 1.13 MIU/L (ref 0.4–4.5)

## 2025-03-12 ENCOUNTER — APPOINTMENT (OUTPATIENT)
Dept: PHYSICAL THERAPY | Facility: CLINIC | Age: 70
End: 2025-03-12
Payer: COMMERCIAL

## 2025-03-26 ENCOUNTER — DOCUMENTATION (OUTPATIENT)
Dept: PHYSICAL THERAPY | Facility: CLINIC | Age: 70
End: 2025-03-26

## 2025-03-26 ENCOUNTER — APPOINTMENT (OUTPATIENT)
Dept: PRIMARY CARE | Facility: CLINIC | Age: 70
End: 2025-03-26
Payer: COMMERCIAL

## 2025-03-26 VITALS
SYSTOLIC BLOOD PRESSURE: 124 MMHG | WEIGHT: 239 LBS | HEART RATE: 50 BPM | HEIGHT: 70 IN | BODY MASS INDEX: 34.22 KG/M2 | DIASTOLIC BLOOD PRESSURE: 76 MMHG | OXYGEN SATURATION: 97 %

## 2025-03-26 DIAGNOSIS — Z12.5 PROSTATE CANCER SCREENING: ICD-10-CM

## 2025-03-26 DIAGNOSIS — E03.9 ACQUIRED HYPOTHYROIDISM: ICD-10-CM

## 2025-03-26 DIAGNOSIS — Z00.00 ENCOUNTER FOR PREVENTATIVE ADULT HEALTH CARE EXAMINATION: Primary | ICD-10-CM

## 2025-03-26 DIAGNOSIS — I28.8 DILATION OF PULMONARY ARTERY (MULTI): ICD-10-CM

## 2025-03-26 PROCEDURE — 1159F MED LIST DOCD IN RCRD: CPT | Performed by: INTERNAL MEDICINE

## 2025-03-26 PROCEDURE — 1160F RVW MEDS BY RX/DR IN RCRD: CPT | Performed by: INTERNAL MEDICINE

## 2025-03-26 PROCEDURE — 99397 PER PM REEVAL EST PAT 65+ YR: CPT | Performed by: INTERNAL MEDICINE

## 2025-03-26 PROCEDURE — 3008F BODY MASS INDEX DOCD: CPT | Performed by: INTERNAL MEDICINE

## 2025-03-26 PROCEDURE — 3078F DIAST BP <80 MM HG: CPT | Performed by: INTERNAL MEDICINE

## 2025-03-26 PROCEDURE — 1124F ACP DISCUSS-NO DSCNMKR DOCD: CPT | Performed by: INTERNAL MEDICINE

## 2025-03-26 PROCEDURE — 3074F SYST BP LT 130 MM HG: CPT | Performed by: INTERNAL MEDICINE

## 2025-03-26 PROCEDURE — 1036F TOBACCO NON-USER: CPT | Performed by: INTERNAL MEDICINE

## 2025-03-26 ASSESSMENT — ENCOUNTER SYMPTOMS
PSYCHIATRIC NEGATIVE: 1
HEMATOLOGIC/LYMPHATIC NEGATIVE: 1
EYES NEGATIVE: 1
ALLERGIC/IMMUNOLOGIC NEGATIVE: 1
RESPIRATORY NEGATIVE: 1
CONSTITUTIONAL NEGATIVE: 1
CARDIOVASCULAR NEGATIVE: 1
GASTROINTESTINAL NEGATIVE: 1
ENDOCRINE NEGATIVE: 1

## 2025-03-26 NOTE — PROGRESS NOTES
"Subjective   Patient ID: Vitaly Puri is a 69 y.o. male who presents for Annual Exam.    Well Adult Physical   Patient here for a comprehensive physical exam.  The patient reports problems - still having knee pain.  Ortho following, no improvement with injections.  No improvement with PT.  Xrays show advanced OA.  Looking at 2nd opinion with [facility].  Has meloxicam for severe pain.  Diet, 2-3 meals, snacks.  HEP, following rec from PT.  Not much cardio.  Other medical conditions deonte.  Compliant with meds.  Meds and labs reviewed.    Do you take any herbs or supplements that were not prescribed by a doctor? no   Are you taking calcium supplements? no   Are you taking aspirin daily? no    PSA: 8-2024    Cologuard: 1-2024         Review of Systems   Constitutional: Negative.    HENT: Negative.     Eyes: Negative.    Respiratory: Negative.     Cardiovascular: Negative.    Gastrointestinal: Negative.    Endocrine: Negative.    Genitourinary: Negative.    Musculoskeletal:  Positive for gait problem.        Knee pain.   Skin: Negative.    Allergic/Immunologic: Negative.    Hematological: Negative.    Psychiatric/Behavioral: Negative.         Objective   /76 (BP Location: Right arm, Patient Position: Sitting)   Pulse 50   Ht 1.778 m (5' 10\")   Wt 108 kg (239 lb)   SpO2 97%   BMI 34.29 kg/m²     Physical Exam  Vitals and nursing note reviewed.   Constitutional:       Appearance: Normal appearance.   HENT:      Head: Normocephalic and atraumatic.      Right Ear: Tympanic membrane normal.      Left Ear: Tympanic membrane normal.      Nose: Nose normal.      Mouth/Throat:      Pharynx: Oropharynx is clear.   Eyes:      Extraocular Movements: Extraocular movements intact.      Conjunctiva/sclera: Conjunctivae normal.      Pupils: Pupils are equal, round, and reactive to light.   Cardiovascular:      Rate and Rhythm: Normal rate and regular rhythm.      Pulses: Normal pulses.      Heart sounds: Normal " heart sounds.   Pulmonary:      Effort: Pulmonary effort is normal.      Breath sounds: Normal breath sounds.   Abdominal:      General: Bowel sounds are normal.      Palpations: Abdomen is soft.   Musculoskeletal:         General: Deformity present. Normal range of motion.      Cervical back: Normal range of motion and neck supple.   Skin:     General: Skin is warm and dry.   Neurological:      General: No focal deficit present.      Mental Status: He is alert and oriented to person, place, and time. Mental status is at baseline.      Gait: Gait abnormal.   Psychiatric:         Mood and Affect: Mood normal.         Behavior: Behavior normal.         Thought Content: Thought content normal.         Judgment: Judgment normal.         Assessment/Plan     Adult Health Maintenance  Labs reviewed.  BP at goal with current medications.  Rec low salt diet.  Check BP at home, goal < 140/90.  Lipids, LDL not at goal with statin.  Recommend low fat/cholesterol diet.  Discussed increasing stain if not at goal next ov.  Thyroid, free T4 and TSH deonte with levothyroxine or synthroid.  Take medication 30 to 60 minutes before breakfast.  Recommend diet, exercise and weight management.  Fu 6 months.    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

## 2025-03-26 NOTE — PROGRESS NOTES
Physical Therapy    Physical Therapy Discharge with no visit    Patient Name: Vitaly Puri  MRN: [id number]  The patient is discharged as of today, 3/26/2025  The patient has attended  4  PT visits.   The pt was referred for:  B knee pain, Low back pain    Therapy assessment: Pt was slowly progressing in therapy however cancelled to 3 appointments.  The pt has not been been for over 30 days and is now discharged.     Rehab Discharge Reason: Failed to schedule and/or keep follow-up appointment(s)

## 2025-06-03 PROBLEM — K92.1 HEMATOCHEZIA: Status: ACTIVE | Noted: 2025-06-03

## 2025-06-03 PROBLEM — M25.559 ARTHRALGIA OF HIP: Status: ACTIVE | Noted: 2025-06-03

## 2025-06-03 PROBLEM — E66.811 CLASS 1 OBESITY WITH BODY MASS INDEX (BMI) OF 34.0 TO 34.9 IN ADULT: Status: ACTIVE | Noted: 2025-06-03

## 2025-06-26 ENCOUNTER — APPOINTMENT (OUTPATIENT)
Dept: CARDIOLOGY | Facility: CLINIC | Age: 70
End: 2025-06-26
Payer: COMMERCIAL

## 2025-09-04 ENCOUNTER — APPOINTMENT (OUTPATIENT)
Dept: CARDIOLOGY | Facility: CLINIC | Age: 70
End: 2025-09-04
Payer: COMMERCIAL

## 2025-09-24 ENCOUNTER — APPOINTMENT (OUTPATIENT)
Dept: PRIMARY CARE | Facility: CLINIC | Age: 70
End: 2025-09-24
Payer: COMMERCIAL